# Patient Record
Sex: FEMALE | Race: WHITE | URBAN - METROPOLITAN AREA
[De-identification: names, ages, dates, MRNs, and addresses within clinical notes are randomized per-mention and may not be internally consistent; named-entity substitution may affect disease eponyms.]

---

## 2017-02-23 ENCOUNTER — OFFICE VISIT (OUTPATIENT)
Dept: RHEUMATOLOGY | Facility: CLINIC | Age: 52
End: 2017-02-23
Attending: INTERNAL MEDICINE
Payer: COMMERCIAL

## 2017-02-23 VITALS
HEART RATE: 72 BPM | SYSTOLIC BLOOD PRESSURE: 105 MMHG | HEIGHT: 67 IN | OXYGEN SATURATION: 95 % | DIASTOLIC BLOOD PRESSURE: 70 MMHG | BODY MASS INDEX: 21.03 KG/M2 | WEIGHT: 134 LBS

## 2017-02-23 DIAGNOSIS — M05.89 OTHER RHEUMATOID ARTHRITIS WITH RHEUMATOID FACTOR OF MULTIPLE SITES (H): ICD-10-CM

## 2017-02-23 DIAGNOSIS — M05.89 OTHER RHEUMATOID ARTHRITIS WITH RHEUMATOID FACTOR OF MULTIPLE SITES (H): Primary | ICD-10-CM

## 2017-02-23 LAB
ALT SERPL W P-5'-P-CCNC: 51 U/L (ref 0–50)
AST SERPL W P-5'-P-CCNC: 29 U/L (ref 0–45)
BASOPHILS # BLD AUTO: 0 10E9/L (ref 0–0.2)
BASOPHILS NFR BLD AUTO: 0.6 %
CREAT SERPL-MCNC: 0.73 MG/DL (ref 0.52–1.04)
CRP SERPL-MCNC: <2.9 MG/L (ref 0–8)
DIFFERENTIAL METHOD BLD: NORMAL
EOSINOPHIL # BLD AUTO: 0.2 10E9/L (ref 0–0.7)
EOSINOPHIL NFR BLD AUTO: 3.6 %
ERYTHROCYTE [DISTWIDTH] IN BLOOD BY AUTOMATED COUNT: 12.3 % (ref 10–15)
GFR SERPL CREATININE-BSD FRML MDRD: 84 ML/MIN/1.7M2
HCT VFR BLD AUTO: 40.9 % (ref 35–47)
HGB BLD-MCNC: 13.2 G/DL (ref 11.7–15.7)
IMM GRANULOCYTES # BLD: 0 10E9/L (ref 0–0.4)
IMM GRANULOCYTES NFR BLD: 0.2 %
LYMPHOCYTES # BLD AUTO: 1.3 10E9/L (ref 0.8–5.3)
LYMPHOCYTES NFR BLD AUTO: 26 %
MCH RBC QN AUTO: 30.3 PG (ref 26.5–33)
MCHC RBC AUTO-ENTMCNC: 32.3 G/DL (ref 31.5–36.5)
MCV RBC AUTO: 94 FL (ref 78–100)
MONOCYTES # BLD AUTO: 0.5 10E9/L (ref 0–1.3)
MONOCYTES NFR BLD AUTO: 9.4 %
NEUTROPHILS # BLD AUTO: 3 10E9/L (ref 1.6–8.3)
NEUTROPHILS NFR BLD AUTO: 60.2 %
NRBC # BLD AUTO: 0 10*3/UL
NRBC BLD AUTO-RTO: 0 /100
PLATELET # BLD AUTO: 197 10E9/L (ref 150–450)
RBC # BLD AUTO: 4.35 10E12/L (ref 3.8–5.2)
WBC # BLD AUTO: 5 10E9/L (ref 4–11)

## 2017-02-23 PROCEDURE — 90732 PPSV23 VACC 2 YRS+ SUBQ/IM: CPT | Mod: ZF

## 2017-02-23 PROCEDURE — 84460 ALANINE AMINO (ALT) (SGPT): CPT | Performed by: INTERNAL MEDICINE

## 2017-02-23 PROCEDURE — 85025 COMPLETE CBC W/AUTO DIFF WBC: CPT | Performed by: INTERNAL MEDICINE

## 2017-02-23 PROCEDURE — 82565 ASSAY OF CREATININE: CPT | Performed by: INTERNAL MEDICINE

## 2017-02-23 PROCEDURE — 84450 TRANSFERASE (AST) (SGOT): CPT | Performed by: INTERNAL MEDICINE

## 2017-02-23 PROCEDURE — 25000128 H RX IP 250 OP 636: Mod: ZF

## 2017-02-23 PROCEDURE — 36415 COLL VENOUS BLD VENIPUNCTURE: CPT | Performed by: INTERNAL MEDICINE

## 2017-02-23 PROCEDURE — 99212 OFFICE O/P EST SF 10 MIN: CPT | Mod: 25,ZF

## 2017-02-23 PROCEDURE — G0009 ADMIN PNEUMOCOCCAL VACCINE: HCPCS | Mod: ZF

## 2017-02-23 PROCEDURE — 86140 C-REACTIVE PROTEIN: CPT | Performed by: INTERNAL MEDICINE

## 2017-02-23 ASSESSMENT — PAIN SCALES - GENERAL: PAINLEVEL: NO PAIN (0)

## 2017-02-23 NOTE — NURSING NOTE
"Chief Complaint   Patient presents with     RECHECK     RA       Initial /70 (BP Location: Right arm, Patient Position: Chair, Cuff Size: Adult Regular)  Pulse 72  Ht 1.702 m (5' 7\")  Wt 60.8 kg (134 lb)  SpO2 95%  BMI 20.99 kg/m2 Estimated body mass index is 20.99 kg/(m^2) as calculated from the following:    Height as of this encounter: 1.702 m (5' 7\").    Weight as of this encounter: 60.8 kg (134 lb).  Medication Reconciliation: complete  Daria Beyer CMA    "

## 2017-02-23 NOTE — MR AVS SNAPSHOT
After Visit Summary   2/23/2017    Daniel He    MRN: [id number]           Patient Information     Date Of Birth          1965        Visit Information        Provider Department      2/23/2017 1:30 PM Greg Merida MD [facility] Rheumatology        Today's Diagnoses     Other rheumatoid arthritis with rheumatoid factor of multiple sites (H)    -  1       Follow-ups after your visit        Future tests that were ordered for you today     Open Future Orders        Priority Expected Expires Ordered    CBC with platelets differential Routine  3/25/2017 2/23/2017    AST Routine  3/25/2017 2/23/2017    ALT Routine  3/25/2017 2/23/2017    Creatinine Routine  3/25/2017 2/23/2017    CRP inflammation Routine  3/25/2017 2/23/2017    X-ray bl hand 3+ vw Routine 2/23/2017 9/21/2017 2/23/2017    X-ray bl Foot 3+ views Routine 2/23/2017 9/21/2017 2/23/2017            Who to contact     If you have questions or need follow up information about today's clinic visit or your schedule please contact [facility] RHEUMATOLOGY directly at [phone].  Normal or non-critical lab and imaging results will be communicated to you by [employer], letter or phone within 4 business days after the clinic has received the results. If you do not hear from us within 7 days, please contact the clinic through [employer] or phone. If you have a critical or abnormal lab result, we will notify you by phone as soon as possible.  Submit refill requests through [employer] or call your pharmacy and they will forward the refill request to us. Please allow 3 business days for your refill to be completed.          Additional Information About Your Visit        MyChart Information     [employer] gives you secure access to your electronic health record. If you see a primary care provider, you can also send messages to your care team and make appointments. If you have questions, please call your primary care clinic.  If you do not have a primary  "care provider, please call [phone] and they will assist you.        Care EveryWhere ID     This is your Care EveryWhere ID. This could be used by other organizations to access your [place] medical records  [id number]        Your Vitals Were     Pulse Height Pulse Oximetry BMI (Body Mass Index)          72 1.702 m (5' 7\") 95% 20.99 kg/m2         Blood Pressure from Last 3 Encounters:   02/23/17 105/70   02/04/16 117/68   04/09/15 113/60    Weight from Last 3 Encounters:   02/23/17 60.8 kg (134 lb)   02/04/16 71 kg (156 lb 9.6 oz)   04/09/15 67.3 kg (148 lb 6.4 oz)              We Performed the Following     PNEUMOCOCCAL VACCINE,ADULT,SQ OR IM        Primary Care Provider Office Phone # Fax #    Melina Mckay [phone]       [id number] [facility]   [place] [address]        Thank you!     Thank you for choosing [facility] RHEUMATOLOGY  for your care. Our goal is always to provide you with excellent care. Hearing back from our patients is one way we can continue to improve our services. Please take a few minutes to complete the written survey that you may receive in the mail after your visit with us. Thank you!             Your Updated Medication List - Protect others around you: Learn how to safely use, store and throw away your medicines at www.disposemymeds.org.          This list is accurate as of: 2/23/17  2:02 PM.  Always use your most recent med list.                   Brand Name Dispense Instructions for use    calcium-vitamin D 600-400 MG-UNIT per tablet    CALTRATE    120 tablet    Take 1 tablet by mouth 2 times daily       Etanercept 50 MG/ML autoinjector    ENBREL SURECLICK    1 kit    Inject 50 mg Subcutaneous every 7 days       FIBER PO          FOSAMAX PO      Take 70 mg by mouth once a week       sertraline 50 MG tablet    ZOLOFT     Take 50 mg by mouth daily         "

## 2017-02-23 NOTE — PROGRESS NOTES
[facility] - Rheumatology Clinic Visit     Daniel He MRN# [id number]   YOB: 1965      Primary care provider: Melina Mckay          Assessment and Plan:   #1 Nodular high -titer ACPA and RF positive Rheumatoid arthritis diagnosed around 2000; no joint erosions noted in 2014  #3 Methotrexate use until about 2010  #4 Chronic enbrel monotherapy for RA  #6 Past h/o tobacco smoking - has tried chantix in past; - quit Jan 2015 at age 50  #7 H/o precancerous cervical lesion; s/p hysterectomy  #8 Osteoporosis- last DEXA 2011; currently on vitamin D, calcium; DEXA 2016- osteopenia    She has been on clinical remission for the past close to 3 years on enbrel monotherapy.   -Continue current management with Enbrel 50 mg/ml injection weekly monotherapy and ibuprofen very occasionally for pain.     Repeat xrays of hands and feet and CRP today. No indication for adding any DMARD for combination therapy at this time as she is in perfect remission even with enbrel monotherapy.    - Smoking cessation successfully in Jan 2015.   - Calcium 1200mg PO day and vitamin D 800 IU daily. Fosamax 70 mg PO weekly. Recheck DEXA 2017    I discussed that I will be moving to [facility] in April 2017. Patient's choice to f/u with Rheumatology at the [facility] or with me in [facility] in 3 months. Patient would receive a letter regarding this sometime in March 2017. Patient prefers to follow up with me in [facility].     Most Recent Immunizations   Administered Date(s) Administered     Influenza (IIV3) 10/01/2013     Influenza Vaccine, 3 YRS +, IM (QUADRIVALENT W/PRESERVATIVES) 02/04/2016     Pneumococcal (PCV 13) 02/04/2016   Pended Date(s) Pended     Pneumococcal 23 valent 02/23/2017      Orders Placed This Encounter   Procedures     X-ray bl hand 3+ vw     X-ray bl Foot 3+ views     PNEUMOCOCCAL VACCINE,ADULT,SQ OR IM     CBC with platelets differential     AST     ALT     Creatinine      CRP inflammation       There are no discontinued medications.  Current Outpatient Prescriptions   Medication Sig Dispense Refill     Alendronate Sodium (FOSAMAX PO) Take 70 mg by mouth once a week       Etanercept (ENBREL SURECLICK) 50 MG/ML autoinjector Inject 50 mg Subcutaneous every 7 days 1 kit 2     calcium-vitamin D (CALTRATE) 600-400 MG-UNIT per tablet Take 1 tablet by mouth 2 times daily 120 tablet 3     sertraline (ZOLOFT) 50 MG tablet Take 50 mg by mouth daily       FIBER PO            Greg Merida MD    Division of Rheumatology  [facility]  Phone (Patients line): [phone]  Pager (healthcare professionals only): [phone]  Phone (healthcare professionals only line): [phone]            Active Problem List:     Patient Active Problem List    Diagnosis Date Noted     Other rheumatoid arthritis with rheumatoid factor of multiple sites (H) 02/04/2016     Priority: Medium            History of Present Illness:     Chief Complaint   Patient presents with     RECHECK     RA     Ms. 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  She presents to clinic today to establish care with a new rheumatologist.  Her previous rheumatologist was Dr. Jovel and was last seen 9 months ago.  She was initially treated with methotrexate and prednisone for her RA though stopped her methotrexate about 4 years ago. Reason for discontinuation: GI side effects.  Enbrel was initiated 8 years ago.  H/o rheumatoid nodule removed surgically in the past from dorsal aspect of R 2nd finger.  She reports that her RA is under control with daily morning stiffness that lasts approximately 15 minutes.  Her only real complaint today is fatigue, which she believes has gotten worse in the past year.      +Fatigue  +Occasional myalgias/arthralgias  +Family hx of gout  +photosensitivity - long history of sensitive skin  but worsened since starting RA medications  +constipation - currently managed on benefiber and probiotics  Current smoker - tried quitting multiple times.   H/o precancerous cervical lesion s/p hysterectomy  Has h/o osteoporosis    No h/o weight loss, loss of appetite, fevers, chills, rash, night sweats, swollen glands  No personal h/o gout   No family or personal history of psoriasis, ulcerative colitis or chron's disease. No high risk sexual history. No low back pain or buttock pain. No h/o sausage digits, iritis. No enthesitis, plantar fascitis or heel pain.   Patient denies any raynauds, malar rash, recurrent mouth/genital ulcers, sicca symptoms, recurrent sinusitis/rhinitis,swallowing difficulty, hearing or visual changes recently.   No h/o miscarriages or arterial/venous thrombosis in the past  No h/o persistent shortness of breath, cough, chest pain  No h/o persistent nausea, vomiting, diarrhea, abdominal pain  No h/o hematochezia, hematuria, hemoptysis, hematemesis  No persistent headache, tingling, numbness, weakness. No h/o seizures or strokes  No temporal headache, no jaw claudication, no scalp tenderness, vision changes, carotidynia, cough    Orders Placed during initial Encounter 2014  XR Hand Bilateral G/E 3 Views  XR Foot Bilateral G/E 3 Views  M Tuberculosis by Quantiferon  CRP inflammation  Erythrocyte sedimentation rate auto  Cyclic Citrullinated Peptide IgG IgA  Hepatitis B surface antigen  Hepatitis C antibody  Antinuclear antibody screen by EIA  Rheumatoid factor    ACPA >250; RF > 600 consistent with the diagnosis of rheumatoid arthritis  Inflammatory markers negative.   Rest of the above labs are within normal limits. No erosions noted in xrays.     RIGOBERTO neg    Interim history:    Interim history:  Since last visit:  1. Infections - No  2. New symptoms/medical problem - No  3. Any side effects from Rheum medications - No  3. ER visits/Hospitalizations/surgeries - No    No morning stiffness X 5  to 10 mins  Denies any daily joint pains    February 23, 2017   Joint pain history:  Involved joints: feet   Pain scale: 3-4/10     Wakes the patient from sleep : no  Morning stiffness :  10 mins  Meds used: Enbrel weekly    Patient can noticed increased achiness on the sixth day of the enbrel cycle  Lost 20 pounds with exercise.     Wt Readings from Last 4 Encounters:   02/23/17 60.8 kg (134 lb)   02/04/16 71 kg (156 lb 9.6 oz)   04/09/15 67.3 kg (148 lb 6.4 oz)   10/03/14 64 kg (141 lb)          Review of Systems:   Complete ROS negative except for symptoms mentioned in the HPI          Past Medical History:     Past Medical History   Diagnosis Date     Anxiety      On sertraline     Hypovitaminosis D      Nephrolithiasis      Osteoporosis      Rheumatoid arthritis(714.0)      Dx'd 12 yrs ago     Past Surgical History   Procedure Laterality Date     Hysterectomy       For precancerous cervical lesions            Social History:     Social History     Occupational History     Shipping/[employer]      Social History Main Topics     Smoking status: Former Smoker     Packs/day: 1.00     Years: 25.00     Types: Cigarettes     Smokeless tobacco: Former User     Quit date: 1/10/2015     Alcohol use 4.2 oz/week     7 Standard drinks or equivalent per week     Drug use: No     Sexual activity: Not on file     Works in [employer]  Smokes 1ppd x 25 years  - quit 2015  EtOH - <1 drink/week  Denies illegal drug use       Family History:     Family History   Problem Relation Age of Onset     DIABETES Father      DIABETES Other      maternal grandparent     Hypertension Father      Hypertension Mother      Rheumatoid Arthritis Paternal Grandmother        Father - living - DM 2, HTN, thyroid disorder (pt unable to specify)  Mother - living - HTN         Allergies:   No Known Allergies         Medications:     Current Outpatient Prescriptions   Medication Sig Dispense Refill     Alendronate Sodium (FOSAMAX PO) Take  "70 mg by mouth once a week       Etanercept (ENBREL SURECLICK) 50 MG/ML autoinjector Inject 50 mg Subcutaneous every 7 days 1 kit 2     calcium-vitamin D (CALTRATE) 600-400 MG-UNIT per tablet Take 1 tablet by mouth 2 times daily 120 tablet 3     sertraline (ZOLOFT) 50 MG tablet Take 50 mg by mouth daily       FIBER PO               Physical Exam:   Blood pressure 105/70, pulse 72, height 1.702 m (5' 7\"), weight 60.8 kg (134 lb), SpO2 95 %.  Wt Readings from Last 4 Encounters:   02/23/17 60.8 kg (134 lb)   02/04/16 71 kg (156 lb 9.6 oz)   04/09/15 67.3 kg (148 lb 6.4 oz)   10/03/14 64 kg (141 lb)       Constitutional: well-developed, appearing stated age; cooperative  Eyes: nl EOM, PERRLA, vision, conjunctiva, sclera  ENT: nl external ears, nose, hearing, lips, teeth, gums, throat  No mucous membrane lesions, normal saliva pool  Neck: no mass or thyroid enlargement  Resp: lungs clear to auscultation,   CV: RRR, no murmurs, rubs or gallops, no edema  GI: no ABD mass or tenderness, no HSM  : not tested  Lymph: no cervical, supraclavicular or epitrochlear nodes  MS: All TMJ, neck, shoulder, elbow, wrist, MCP/PIP/DIP, spine, hip, knee, ankle, and foot  MTP/IP joints were examined. Minimal synovial thickening of MCP/PIPs b/l- has resolved. Rheumatoid nodule on palmar aspect of R 4th finger still there. Right elbow 0.3cm rheumatoid nodule - smaller than last year  2 big subcutaneous nodules in the great toes bilaterally. Full ROM.  Fist 100%.  No dactylitis,  tenosynovitis, enthesopathy.  Skin: no nail pitting, alopecia, rash, nodules or lesions  Psych: nl judgement, orientation, memory, affect.         Data:     Results for orders placed or performed in visit on 02/04/16   XR Foot Bilateral G/E 3 Views    Narrative    Exam: 3 views bilateral foot    History: Rheumatoid arthritis.    Comparison: 04/07/2014.    Findings:    AP, oblique, and lateral views of each foot were obtained.    Left: There is redemonstration of " soft tissue prominence at the first  interphalangeal joint medially with underlying erosive change with  sclerotic margin, similar to prior. No underlying erosive change. No  acute osseous abnormalities. No acute osseous abnormality.    Right: Redemonstration of focal soft tissue swelling with underlying  cystic change of the medial arch of first interphalangeal joint. There  is redemonstration of cystic change involving the fifth metatarsal  neck, progressed from prior.. Somewhat deformed contour of the fifth  metatarsal neck, maybe related to prior trauma. Plantar calcaneal  enthesophyte.      Impression    Impression:  1. Unchanged soft tissue prominence with underlying erosion/cystic  change of the bilateral first interphalangeal joints. These findings  radiographically favors entity such as gout with associated gouty  tophus.   2. Progression of the erosive/cystic change of the fifth metatarsal  head change, which may be from underlying rheumatoid arthritis  alternatively representing crystalline deposition disease.    NED JETER       No results found for this basename: WBC:3,RBC:3,HGB:3,HCT:3,MCT:3,MCV:3,RDW:3,PLT:3,CCPAB,ALBUMIN:3, AST:3, ALT:3,CRP:3, ESR:3,BUN:3,CREAT:3 in the last 95533 hours   No results found for this basename: TSH:3,T4:3 in the last 05465 hours  Greg Merida MD    Division of Rheumatology  [facility]  Phone (Patients line): [phone]  Pager (healthcare professionals only): [phone]  Phone (healthcare professionals only line): [phone]  HPI      ROS      Physical Exam

## 2017-02-23 NOTE — LETTER
2/23/2017      RE: Daniel He  [address] [facility] [address]       [facility] - Rheumatology Clinic Visit     Daniel He MRN# [id number]   YOB: 1965      Primary care provider: Melina Mckay          Assessment and Plan:   #1 Nodular high -titer ACPA and RF positive Rheumatoid arthritis diagnosed around 2000; no joint erosions noted in 2014  #3 Methotrexate use until about 2010  #4 Chronic enbrel monotherapy for RA  #6 Past h/o tobacco smoking - has tried chantix in past; - quit Jan 2015 at age 50  #7 H/o precancerous cervical lesion; s/p hysterectomy  #8 Osteoporosis- last DEXA 2011; currently on vitamin D, calcium; DEXA 2016- osteopenia    She has been on clinical remission for the past close to 3 years on enbrel monotherapy.   -Continue current management with Enbrel 50 mg/ml injection weekly monotherapy and ibuprofen very occasionally for pain.     Repeat xrays of hands and feet and CRP today. No indication for adding any DMARD for combination therapy at this time as she is in perfect remission even with enbrel monotherapy.    - Smoking cessation successfully in Jan 2015.   - Calcium 1200mg PO day and vitamin D 800 IU daily. Fosamax 70 mg PO weekly. Recheck DEXA 2017    I discussed that I will be moving to [facility] in April 2017. Patient's choice to f/u with Rheumatology at the [facility] or with me in [facility] in 3 months. Patient would receive a letter regarding this sometime in March 2017. Patient prefers to follow up with me in [facility].     Most Recent Immunizations   Administered Date(s) Administered     Influenza (IIV3) 10/01/2013     Influenza Vaccine, 3 YRS +, IM (QUADRIVALENT W/PRESERVATIVES) 02/04/2016     Pneumococcal (PCV 13) 02/04/2016   Pended Date(s) Pended     Pneumococcal 23 valent 02/23/2017      Orders Placed This Encounter   Procedures     X-ray bl hand 3+ vw     X-ray bl Foot 3+ views     PNEUMOCOCCAL  VACCINE,ADULT,SQ OR IM     CBC with platelets differential     AST     ALT     Creatinine     CRP inflammation       There are no discontinued medications.  Current Outpatient Prescriptions   Medication Sig Dispense Refill     Alendronate Sodium (FOSAMAX PO) Take 70 mg by mouth once a week       Etanercept (ENBREL SURECLICK) 50 MG/ML autoinjector Inject 50 mg Subcutaneous every 7 days 1 kit 2     calcium-vitamin D (CALTRATE) 600-400 MG-UNIT per tablet Take 1 tablet by mouth 2 times daily 120 tablet 3     sertraline (ZOLOFT) 50 MG tablet Take 50 mg by mouth daily       FIBER PO            Greg Merida MD    Division of Rheumatology  [facility]  Phone (Patients line): [phone]  Pager (healthcare professionals only): [phone]  Phone (healthcare professionals only line): [phone]            Active Problem List:     Patient Active Problem List    Diagnosis Date Noted     Other rheumatoid arthritis with rheumatoid factor of multiple sites (H) 02/04/2016     Priority: Medium            History of Present Illness:     Chief Complaint   Patient presents with     RECHECK     RA     Ms. 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  She presents to clinic today to establish care with a new rheumatologist.  Her previous rheumatologist was Dr. Jovel and was last seen 9 months ago.  She was initially treated with methotrexate and prednisone for her RA though stopped her methotrexate about 4 years ago. Reason for discontinuation: GI side effects.  Enbrel was initiated 8 years ago.  H/o rheumatoid nodule removed surgically in the past from dorsal aspect of R 2nd finger.  She reports that her RA is under control with daily morning stiffness that lasts approximately 15 minutes.  Her only real complaint today is fatigue, which she believes has gotten worse in the past year.      +Fatigue  +Occasional  myalgias/arthralgias  +Family hx of gout  +photosensitivity - long history of sensitive skin but worsened since starting RA medications  +constipation - currently managed on benefiber and probiotics  Current smoker - tried quitting multiple times.   H/o precancerous cervical lesion s/p hysterectomy  Has h/o osteoporosis    No h/o weight loss, loss of appetite, fevers, chills, rash, night sweats, swollen glands  No personal h/o gout   No family or personal history of psoriasis, ulcerative colitis or chron's disease. No high risk sexual history. No low back pain or buttock pain. No h/o sausage digits, iritis. No enthesitis, plantar fascitis or heel pain.   Patient denies any raynauds, malar rash, recurrent mouth/genital ulcers, sicca symptoms, recurrent sinusitis/rhinitis,swallowing difficulty, hearing or visual changes recently.   No h/o miscarriages or arterial/venous thrombosis in the past  No h/o persistent shortness of breath, cough, chest pain  No h/o persistent nausea, vomiting, diarrhea, abdominal pain  No h/o hematochezia, hematuria, hemoptysis, hematemesis  No persistent headache, tingling, numbness, weakness. No h/o seizures or strokes  No temporal headache, no jaw claudication, no scalp tenderness, vision changes, carotidynia, cough    Orders Placed during initial Encounter 2014  XR Hand Bilateral G/E 3 Views  XR Foot Bilateral G/E 3 Views  M Tuberculosis by Quantiferon  CRP inflammation  Erythrocyte sedimentation rate auto  Cyclic Citrullinated Peptide IgG IgA  Hepatitis B surface antigen  Hepatitis C antibody  Antinuclear antibody screen by EIA  Rheumatoid factor    ACPA >250; RF > 600 consistent with the diagnosis of rheumatoid arthritis  Inflammatory markers negative.   Rest of the above labs are within normal limits. No erosions noted in xrays.     RIGOBERTO neg    Interim history:    Interim history:  Since last visit:  1. Infections - No  2. New symptoms/medical problem - No  3. Any side effects from  Rheum medications - No  3. ER visits/Hospitalizations/surgeries - No    No morning stiffness X 5 to 10 mins  Denies any daily joint pains    February 23, 2017   Joint pain history:  Involved joints: feet   Pain scale: 3-4/10     Wakes the patient from sleep : no  Morning stiffness :  10 mins  Meds used: Enbrel weekly    Patient can noticed increased achiness on the sixth day of the enbrel cycle  Lost 20 pounds with exercise.     Wt Readings from Last 4 Encounters:   02/23/17 60.8 kg (134 lb)   02/04/16 71 kg (156 lb 9.6 oz)   04/09/15 67.3 kg (148 lb 6.4 oz)   10/03/14 64 kg (141 lb)          Review of Systems:   Complete ROS negative except for symptoms mentioned in the HPI          Past Medical History:     Past Medical History   Diagnosis Date     Anxiety      On sertraline     Hypovitaminosis D      Nephrolithiasis      Osteoporosis      Rheumatoid arthritis(714.0)      Dx'd 12 yrs ago     Past Surgical History   Procedure Laterality Date     Hysterectomy       For precancerous cervical lesions            Social History:     Social History     Occupational History     [employer]      Social History Main Topics     Smoking status: Former Smoker     Packs/day: 1.00     Years: 25.00     Types: Cigarettes     Smokeless tobacco: Former User     Quit date: 1/10/2015     Alcohol use 4.2 oz/week     7 Standard drinks or equivalent per week     Drug use: No     Sexual activity: Not on file     Works in [employer]  Smokes 1ppd x 25 years  - quit 2015  EtOH - <1 drink/week  Denies illegal drug use       Family History:     Family History   Problem Relation Age of Onset     DIABETES Father      DIABETES Other      maternal grandparent     Hypertension Father      Hypertension Mother      Rheumatoid Arthritis Paternal Grandmother        Father - living - DM 2, HTN, thyroid disorder (pt unable to specify)  Mother - living - HTN         Allergies:   No Known Allergies         Medications:     Current  "Outpatient Prescriptions   Medication Sig Dispense Refill     Alendronate Sodium (FOSAMAX PO) Take 70 mg by mouth once a week       Etanercept (ENBREL SURECLICK) 50 MG/ML autoinjector Inject 50 mg Subcutaneous every 7 days 1 kit 2     calcium-vitamin D (CALTRATE) 600-400 MG-UNIT per tablet Take 1 tablet by mouth 2 times daily 120 tablet 3     sertraline (ZOLOFT) 50 MG tablet Take 50 mg by mouth daily       FIBER PO               Physical Exam:   Blood pressure 105/70, pulse 72, height 1.702 m (5' 7\"), weight 60.8 kg (134 lb), SpO2 95 %.  Wt Readings from Last 4 Encounters:   02/23/17 60.8 kg (134 lb)   02/04/16 71 kg (156 lb 9.6 oz)   04/09/15 67.3 kg (148 lb 6.4 oz)   10/03/14 64 kg (141 lb)       Constitutional: well-developed, appearing stated age; cooperative  Eyes: nl EOM, PERRLA, vision, conjunctiva, sclera  ENT: nl external ears, nose, hearing, lips, teeth, gums, throat  No mucous membrane lesions, normal saliva pool  Neck: no mass or thyroid enlargement  Resp: lungs clear to auscultation,   CV: RRR, no murmurs, rubs or gallops, no edema  GI: no ABD mass or tenderness, no HSM  : not tested  Lymph: no cervical, supraclavicular or epitrochlear nodes  MS: All TMJ, neck, shoulder, elbow, wrist, MCP/PIP/DIP, spine, hip, knee, ankle, and foot  MTP/IP joints were examined. Minimal synovial thickening of MCP/PIPs b/l- has resolved. Rheumatoid nodule on palmar aspect of R 4th finger still there. Right elbow 0.3cm rheumatoid nodule - smaller than last year  2 big subcutaneous nodules in the great toes bilaterally. Full ROM.  Fist 100%.  No dactylitis,  tenosynovitis, enthesopathy.  Skin: no nail pitting, alopecia, rash, nodules or lesions  Psych: nl judgement, orientation, memory, affect.         Data:     Results for orders placed or performed in visit on 02/04/16   XR Foot Bilateral G/E 3 Views    Narrative    Exam: 3 views bilateral foot    History: Rheumatoid arthritis.    Comparison: " 2014.    Findings:    AP, oblique, and lateral views of each foot were obtained.    Left: There is redemonstration of soft tissue prominence at the first  interphalangeal joint medially with underlying erosive change with  sclerotic margin, similar to prior. No underlying erosive change. No  acute osseous abnormalities. No acute osseous abnormality.    Right: Redemonstration of focal soft tissue swelling with underlying  cystic change of the medial arch of first interphalangeal joint. There  is redemonstration of cystic change involving the fifth metatarsal  neck, progressed from prior.. Somewhat deformed contour of the fifth  metatarsal neck, maybe related to prior trauma. Plantar calcaneal  enthesophyte.      Impression    Impression:  1. Unchanged soft tissue prominence with underlying erosion/cystic  change of the bilateral first interphalangeal joints. These findings  radiographically favors entity such as gout with associated gouty  tophus.   2. Progression of the erosive/cystic change of the fifth metatarsal  head change, which may be from underlying rheumatoid arthritis  alternatively representing crystalline deposition disease.    NED JETER       No results found for this basename: WBC:3,RBC:3,HGB:3,HCT:3,MCT:3,MCV:3,RDW:3,PLT:3,CCPAB,ALBUMIN:3, AST:3, ALT:3,CRP:3, ESR:3,BUN:3,CREAT:3 in the last 27801 hours   No results found for this basename: TSH:3,T4:3 in the last 45768 hours  Greg Merida MD    Division of Rheumatology  [facility]  Phone (Patients line): [phone]  Pager (healthcare professionals only): [phone]  Phone (healthcare professionals only line): [phone]

## 2017-02-24 NOTE — PROGRESS NOTES
Results released to [facility]:  Jarrod Sanchez,  Basic blood cell counts, liver and kidney function labs within normal limits except borderline ALT which can be watched. I recommend that ALT is repeated in 3 months to make sure. My nurse would contact you regarding that.   Inflammatory markers are normal.     Sincerely    Greg Merida MD

## 2017-02-28 ENCOUNTER — TELEPHONE (OUTPATIENT)
Dept: RHEUMATOLOGY | Facility: CLINIC | Age: 52
End: 2017-02-28

## 2017-02-28 NOTE — TELEPHONE ENCOUNTER
[facility] Prior Authorization Team   Phone: [phone]  Fax: [phone]    PA Initiation    Medication: Enbrel Sureclick  Insurance Company: [employer] - Phone [phone] Fax [phone]  Pharmacy Filling the Rx: [facility] - [place], [place] - [address]  Filling Pharmacy Phone: [phone]  Filling Pharmacy Fax: [phone]  Start Date: 2/28/2017

## 2017-03-01 ENCOUNTER — MYC MEDICAL ADVICE (OUTPATIENT)
Dept: RHEUMATOLOGY | Facility: CLINIC | Age: 52
End: 2017-03-01

## 2017-03-01 NOTE — TELEPHONE ENCOUNTER
Prior Authorization Approval    Authorization Effective Date: 2/28/2017  Authorization Expiration Date: 2/28/2019  Medication: Enbrel Sureclick - Approved  Approved Dose/Quantity: once weekly   Reference #: [id number]   Insurance Company: [employer] [phone] Fax [phone]  Expected CoPay:       CoPay Card Available:      Foundation Assistance Needed:    Which Pharmacy is filling the prescription (Not needed for infusion/clinic administered): [facility] - [place], [place] - [facility] [address]  Pharmacy Notified: Yes  Patient Notified: Yes

## 2017-03-02 DIAGNOSIS — M05.89 OTHER RHEUMATOID ARTHRITIS WITH RHEUMATOID FACTOR OF MULTIPLE SITES (H): ICD-10-CM

## 2017-10-16 DIAGNOSIS — M06.9 RHEUMATOID ARTHRITIS INVOLVING MULTIPLE JOINTS (H): Primary | ICD-10-CM

## 2017-10-16 NOTE — LETTER
October 17, 2017      TO: Daniel Villegasnes  [address] [address] [place] [id number]     Dear Ms. Daniel He,    This letter is a reminder that you are overdue for an Annual Visit and needed labs. You must be seen on a yearly basis and have appropriate labs drawn for continued care and prescription refills.   Please call to schedule an appointment.  ANTONIETA VIZCAINO is now at [facility]; you may schedule there or continue here.    You have been given only a  90 DAY  supply/refill of your ENBREL SURECLICK while you get your clinic visit/labs completed.    Regards, Rheumatology Clinic

## 2017-10-17 NOTE — TELEPHONE ENCOUNTER
ENBREL       Last Written Prescription Date:  3/2/17  Last Fill Quantity: 1KIT,   # refills: 5  Last Office Visit : 2/23/17  Future Office visit:  NONE  OVER DUE MD APPT.   LETTER +   90 DAY RF

## 2017-12-17 ENCOUNTER — HEALTH MAINTENANCE LETTER (OUTPATIENT)
Age: 52
End: 2017-12-17

## 2018-01-10 DIAGNOSIS — M06.9 RHEUMATOID ARTHRITIS INVOLVING MULTIPLE JOINTS (H): ICD-10-CM

## 2018-01-10 NOTE — TELEPHONE ENCOUNTER
Enbrel   Last Written Prescription Date:  10/18/17  Last Fill Quantity: 3 kit,   # refills: 0  Last Office Visit : 2/3/17  Future Office visit:  Future appt at [facility]    Routing refill request to provider for review/approval because:  Former pt of Dr. Merida, has not been seen yet at [facility]

## 2018-01-11 ENCOUNTER — TELEPHONE (OUTPATIENT)
Dept: RHEUMATOLOGY | Facility: CLINIC | Age: 53
End: 2018-01-11

## 2018-01-11 NOTE — TELEPHONE ENCOUNTER
Prior Authorization Approval    Authorization Effective Date: 1/4/2018  Authorization Expiration Date: 1/11/2019  Medication: ENBREL - Approved  Approved Dose/Quantity: 4 for 28 days  Reference #: [id number]   Insurance Company: [email or web address] - Phone [phone] Fax [phone]  Expected CoPay:       CoPay Card Available: Yes   Foundation Assistance Needed:    Which Pharmacy is filling the prescription (Not needed for infusion/clinic administered): [facility] - [place], [place] - [facility] [address]  Pharmacy Notified: Yes  Patient Notified: Yes

## 2018-01-11 NOTE — TELEPHONE ENCOUNTER
PA Initiation    Medication: ENBREL  Insurance Company: [employer] - Phone [phone] Fax [phone]  Pharmacy Filling the Rx: [facility] - [place], [place] - [facility] [address]  Filling Pharmacy Phone:    Filling Pharmacy Fax:    Start Date: 1/11/2018

## 2018-01-30 DIAGNOSIS — M06.9 RHEUMATOID ARTHRITIS INVOLVING MULTIPLE JOINTS (H): ICD-10-CM

## 2018-01-30 NOTE — TELEPHONE ENCOUNTER
Patient calls back.  She is asking to disregard the request from [facility] and she is aware that [facility] has it.  She did already let [facility] know that they mistakenly sent this to us.    Gely Moss RN  Message handled by Nurse Triage.

## 2018-01-30 NOTE — TELEPHONE ENCOUNTER
Etanercept (ENBREL SURECLICK) 50 MG/ML autoinjector      Last Written Prescription Date:  1/10/2018  Last Fill Quantity: 3 kit,   # refills: 0  Last Office Visit: 2/23/2017  Future Office visit:    Next 5 appointments (look out 90 days)     Feb 26, 2018  2:15 PM CST   Return Visit with Greg Merida MD   [facility] ([address] [address] [phone]                   Routing refill request to provider for review/approval because:  Drug not on the [facility], [facility] or  Health refill protocol or controlled substance

## 2018-01-30 NOTE — TELEPHONE ENCOUNTER
There was a rx sent to  [facility] on 1/10/18 for Enbrel.     A PA was done (see 1/11/18 encounter) stating rx is being filled by  Specialty pharmacy.     Pharmacy requesting this refill is for [facility].     Left voicemail for patient to call back the clinic to confirm what pharmacy she is using for this medication.

## 2018-02-23 NOTE — PROGRESS NOTES
[place] - Rheumatology Clinic Visit     Daniel He MRN# [id number]   YOB: 1965      Primary care provider: Melina Mckay          Assessment and Plan:   #1 Nodular high -titer ACPA and RF positive (> 600) EROSIVE Rheumatoid arthritis diagnosed around 2000  #3 Methotrexate use until about 2010  #4 Chronic enbrel monotherapy for RA  #6 Past h/o tobacco smoking - has tried chantix in past; - quit Jan 2015 at age 50  #7 H/o precancerous cervical lesion; s/p hysterectomy  #8 Osteoporosis- last DEXA 2011; currently on vitamin D, calcium; DEXA 2016- osteopenia    She has been on clinical remission for the past close to 4 years on enbrel monotherapy. Okay to reduce enbrel to every 10 days. If flaring up with RA, then increase back to every 7 days.     Repeat xrays of hands and feet 2/19. No indication for adding any DMARD for combination therapy at this time as she is in perfect remission even with enbrel monotherapy.    - Smoking cessation successfully in Jan 2015.   - Calcium 1200mg PO day and vitamin D 800 IU daily. Fosamax 70 mg PO weekly. Recheck DEXA 2018. Patient mentioned she would discuss with PCP in summer 2018.     Most Recent Immunizations   Administered Date(s) Administered     Influenza (IIV3) PF 10/01/2013     Influenza Vaccine, 3 YRS +, IM (QUADRIVALENT W/PRESERVATIVES) 02/04/2016     Pneumo Conj 13-V (2010&after) 02/04/2016     Pneumococcal 23 valent 02/23/2017      Orders Placed This Encounter   Procedures     CBC with platelets differential     AST     ALT     Creatinine     Erythrocyte sedimentation rate auto     Hepatitis B surface antigen     Hepatitis C antibody     M Tuberculosis by Quantiferon       There are no discontinued medications.  Current Outpatient Prescriptions   Medication Sig Dispense Refill     Etanercept (ENBREL SURECLICK) 50 MG/ML autoinjector Inject 50 mg Subcutaneous See Admin Instructions Every 10 days. Hold for signs of infection, and seek medical attention. 1  kit 10     Etanercept (ENBREL SURECLICK) 50 MG/ML autoinjector Inject 50 mg Subcutaneous once a week 3 kit 0     Alendronate Sodium (FOSAMAX PO) Take 70 mg by mouth once a week       calcium-vitamin D (CALTRATE) 600-400 MG-UNIT per tablet Take 1 tablet by mouth 2 times daily 120 tablet 3     sertraline (ZOLOFT) 50 MG tablet Take 50 mg by mouth daily       FIBER PO          Greg Merida MD          Active Problem List:     Patient Active Problem List    Diagnosis Date Noted     Rheumatoid arthritis involving multiple joints (H) 02/04/2016     Priority: Medium            History of Present Illness:     Chief Complaint   Patient presents with     RECHECK     Ms. 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  She presents to clinic today to establish care with a new rheumatologist.  Her previous rheumatologist was Dr. Jovel and was last seen 9 months ago.  She was initially treated with methotrexate and prednisone for her RA though stopped her methotrexate about 4 years ago. Reason for discontinuation: GI side effects.  Enbrel was initiated 8 years ago.  H/o rheumatoid nodule removed surgically in the past from dorsal aspect of R 2nd finger.  She reports that her RA is under control with daily morning stiffness that lasts approximately 15 minutes.  Her only real complaint today is fatigue, which she believes has gotten worse in the past year.      +Fatigue  +Occasional myalgias/arthralgias  +Family hx of gout  +photosensitivity - long history of sensitive skin but worsened since starting RA medications  +constipation - currently managed on benefiber and probiotics  Current smoker - tried quitting multiple times.   H/o precancerous cervical lesion s/p hysterectomy  Has h/o osteoporosis    No h/o weight loss, loss of appetite, fevers, chills, rash, night sweats, swollen glands  No personal h/o gout   No family or personal history  of psoriasis, ulcerative colitis or chron's disease. No high risk sexual history. No low back pain or buttock pain. No h/o sausage digits, iritis. No enthesitis, plantar fascitis or heel pain.   Patient denies any raynauds, malar rash, recurrent mouth/genital ulcers, sicca symptoms, recurrent sinusitis/rhinitis,swallowing difficulty, hearing or visual changes recently.   No h/o miscarriages or arterial/venous thrombosis in the past  No h/o persistent shortness of breath, cough, chest pain  No h/o persistent nausea, vomiting, diarrhea, abdominal pain  No h/o hematochezia, hematuria, hemoptysis, hematemesis  No persistent headache, tingling, numbness, weakness. No h/o seizures or strokes  No temporal headache, no jaw claudication, no scalp tenderness, vision changes, carotidynia, cough    Orders Placed during initial Encounter 2014  XR Hand Bilateral G/E 3 Views  XR Foot Bilateral G/E 3 Views  M Tuberculosis by Quantiferon  CRP inflammation  Erythrocyte sedimentation rate auto  Cyclic Citrullinated Peptide IgG IgA  Hepatitis B surface antigen  Hepatitis C antibody  Antinuclear antibody screen by EIA  Rheumatoid factor    ACPA >250; RF > 600 consistent with the diagnosis of rheumatoid arthritis  Inflammatory markers negative.   Rest of the above labs are within normal limits. No erosions noted in xrays.     RIGOBERTO neg    Interim history:    Interim history:  Since last visit:  1. Infections - No  2. New symptoms/medical problem - No  3. Any side effects from Rheum medications - No  3. ER visits/Hospitalizations/surgeries - No    No morning stiffness X 5 to 10 mins  Denies any daily joint pains    February 23, 2017   Joint pain history:  Involved joints: feet   Pain scale: 3-4/10     Wakes the patient from sleep : no  Morning stiffness :  10 mins  Meds used: Enbrel weekly    Patient can noticed increased achiness on the sixth day of the enbrel cycle  Lost 20 pounds with exercise.     February 26, 2018  Have you ever seen a  rheumatologist Yes Who You When 2/23/17  Joint pain history  Onset: pt states that she is doing well. Has not had any flares  Involved joints: see above   Pain scale:  0.5/10     Wakes the patient from sleep : No  Morning stiffness:Yes for 5 minutes  Meds used:enbrel monotherapy     Interim history  Since last visit:  1. Infections - No  2. New symptoms/medical problem - No  3. Any side effects from Rheum medications -none  3. ER visits/Hospitalizations/surgeries - No  4. Last PCP visit: 3 days ago     Wt Readings from Last 4 Encounters:   02/26/18 63 kg (138 lb 12.8 oz)   02/23/17 60.8 kg (134 lb)   02/04/16 71 kg (156 lb 9.6 oz)   04/09/15 67.3 kg (148 lb 6.4 oz)          Review of Systems:   Complete ROS negative except for symptoms mentioned in the HPI          Past Medical History:     Past Medical History:   Diagnosis Date     Anxiety     On sertraline     Hypovitaminosis D      Nephrolithiasis      Osteoporosis      Rheumatoid arthritis(714.0)     Dx'd 12 yrs ago     Past Surgical History:   Procedure Laterality Date     HYSTERECTOMY      For precancerous cervical lesions            Social History:     Social History     Occupational History     [employer]      Social History Main Topics     Smoking status: Former Smoker     Packs/day: 1.00     Years: 25.00     Types: Cigarettes     Smokeless tobacco: Former User     Quit date: 1/10/2015     Alcohol use 0.0 oz/week     0 Standard drinks or equivalent per week      Comment: socially     Drug use: No     Sexual activity: Yes     Works in [employer]  Smokes 1ppd x 25 years  - quit 2015  EtOH - <1 drink/week  Denies illegal drug use       Family History:     Family History   Problem Relation Age of Onset     DIABETES Father      Hypertension Father      DIABETES Other      maternal grandparent     Hypertension Mother      Rheumatoid Arthritis Paternal Grandmother        Father - living - DM 2, HTN, thyroid disorder (pt unable to  "specify)  Mother - living - HTN         Allergies:   No Known Allergies         Medications:     Current Outpatient Prescriptions   Medication Sig Dispense Refill     Etanercept (ENBREL SURECLICK) 50 MG/ML autoinjector Inject 50 mg Subcutaneous See Admin Instructions Every 10 days. Hold for signs of infection, and seek medical attention. 1 kit 10     Etanercept (ENBREL SURECLICK) 50 MG/ML autoinjector Inject 50 mg Subcutaneous once a week 3 kit 0     Alendronate Sodium (FOSAMAX PO) Take 70 mg by mouth once a week       calcium-vitamin D (CALTRATE) 600-400 MG-UNIT per tablet Take 1 tablet by mouth 2 times daily 120 tablet 3     sertraline (ZOLOFT) 50 MG tablet Take 50 mg by mouth daily       FIBER PO               Physical Exam:   Blood pressure 104/62, pulse 75, temperature 97.9  F (36.6  C), temperature source Oral, height 1.702 m (5' 7\"), weight 63 kg (138 lb 12.8 oz), SpO2 96 %.  Wt Readings from Last 4 Encounters:   02/26/18 63 kg (138 lb 12.8 oz)   02/23/17 60.8 kg (134 lb)   02/04/16 71 kg (156 lb 9.6 oz)   04/09/15 67.3 kg (148 lb 6.4 oz)       Constitutional: well-developed, appearing stated age; cooperative  Eyes: nl EOM, PERRLA, vision, conjunctiva, sclera  ENT: nl external ears, nose, hearing, lips, teeth, gums, throat  No mucous membrane lesions, normal saliva pool  Neck: no mass or thyroid enlargement  Resp: lungs clear to auscultation,   CV: RRR, no murmurs, rubs or gallops, no edema  GI: no ABD mass or tenderness, no HSM  : not tested  Lymph: no cervical, supraclavicular or epitrochlear nodes  MS: All TMJ, neck, shoulder, elbow, wrist, MCP/PIP/DIP, spine, hip, knee, ankle, and foot  MTP/IP joints were examined. Minimal synovial thickening of MCP/PIPs b/l- has resolved. Rheumatoid nodule on palmar aspect of R 4th finger still there. Right elbow 0.3cm rheumatoid nodule - smaller than last year  2 big subcutaneous nodules in the great toes bilaterally. Full ROM.  Fist 100%.  No dactylitis,  " tenosynovitis, enthesopathy.  Skin: no nail pitting, alopecia, rash, nodules or lesions  Psych: nl judgement, orientation, memory, affect.         Data:     Results for orders placed or performed in visit on 17   X-ray bl Foot 3+ views    Narrative    EXAMINATION: XR FOOT BILATERAL G/E 3 VW  2017 2:45 PM     CLINICAL HISTORY:  Other rheumatoid arthritis with rheumatoid factor  of multiple sites     COMPARISON: 2016    FINDINGS: AP, oblique and lateral views of bilateral feet were  obtained.     Left foot: Redemonstration of soft tissue prominence at the first  distal interphalangeal joint on the left. Otherwise joint spaces are  preserved. Tarsal bones are normally aligned.    Right foot: Redemonstration of soft tissue swelling at the first  digits interphalangeal joint this associated marginal erosive changes  and subtle cortical sclerosis. Persistent cortical irregularity about  the fifth metatarsal head. Otherwise preserved joint spaces.      Impression    IMPRESSION:   1. Right foot first digits interphalangeal joints marginal erosive  changes and marked soft tissue swelling. Differential diagnosis  includes gout or other crystal deposition diseases. Persistent fifth  metatarsal head cortical irregularity.  2. Left foot first digits interphalangeal joint soft tissue swelling,  mild subcortical sclerosis and subcortical cystic changes, unchanged.  Similar differential considerations as were raised on the right.    JUTTA ELLERMANN, MD       No results found for this basename: WBC:3,RBC:3,HGB:3,HCT:3,MCT:3,MCV:3,RDW:3,PLT:3,CCPAB,ALBUMIN:3, AST:3, ALT:3,CRP:3, ESR:3,BUN:3,CREAT:3 in the last 77124 hours   No results found for this basename: TSH:3,T4:3 in the last 26397 hours  Greg Merida MD    Division of Rheumatology  [facility]  Phone (Patients line): [phone]  Pager (healthcare professionals only): [phone]  Phone (healthcare professionals only  line): [phone]  HPI      ROS      Physical Exam

## 2018-02-26 ENCOUNTER — OFFICE VISIT (OUTPATIENT)
Dept: RHEUMATOLOGY | Facility: CLINIC | Age: 53
End: 2018-02-26
Payer: COMMERCIAL

## 2018-02-26 VITALS
WEIGHT: 138.8 LBS | TEMPERATURE: 97.9 F | BODY MASS INDEX: 21.79 KG/M2 | HEIGHT: 67 IN | OXYGEN SATURATION: 96 % | DIASTOLIC BLOOD PRESSURE: 62 MMHG | SYSTOLIC BLOOD PRESSURE: 104 MMHG | HEART RATE: 75 BPM

## 2018-02-26 DIAGNOSIS — M06.9 RHEUMATOID ARTHRITIS INVOLVING MULTIPLE JOINTS (H): ICD-10-CM

## 2018-02-26 LAB
BASOPHILS # BLD AUTO: 0 10E9/L (ref 0–0.2)
BASOPHILS NFR BLD AUTO: 0.6 %
DIFFERENTIAL METHOD BLD: NORMAL
EOSINOPHIL # BLD AUTO: 0.4 10E9/L (ref 0–0.7)
EOSINOPHIL NFR BLD AUTO: 5.8 %
ERYTHROCYTE [DISTWIDTH] IN BLOOD BY AUTOMATED COUNT: 12.6 % (ref 10–15)
ERYTHROCYTE [SEDIMENTATION RATE] IN BLOOD BY WESTERGREN METHOD: 16 MM/H (ref 0–30)
HCT VFR BLD AUTO: 40.6 % (ref 35–47)
HGB BLD-MCNC: 13.1 G/DL (ref 11.7–15.7)
LYMPHOCYTES # BLD AUTO: 2.5 10E9/L (ref 0.8–5.3)
LYMPHOCYTES NFR BLD AUTO: 38.5 %
MCH RBC QN AUTO: 30.9 PG (ref 26.5–33)
MCHC RBC AUTO-ENTMCNC: 32.3 G/DL (ref 31.5–36.5)
MCV RBC AUTO: 96 FL (ref 78–100)
MONOCYTES # BLD AUTO: 0.4 10E9/L (ref 0–1.3)
MONOCYTES NFR BLD AUTO: 6.6 %
NEUTROPHILS # BLD AUTO: 3.1 10E9/L (ref 1.6–8.3)
NEUTROPHILS NFR BLD AUTO: 48.5 %
PLATELET # BLD AUTO: 218 10E9/L (ref 150–450)
RBC # BLD AUTO: 4.24 10E12/L (ref 3.8–5.2)
WBC # BLD AUTO: 6.4 10E9/L (ref 4–11)

## 2018-02-26 PROCEDURE — 99214 OFFICE O/P EST MOD 30 MIN: CPT | Performed by: INTERNAL MEDICINE

## 2018-02-26 PROCEDURE — 86803 HEPATITIS C AB TEST: CPT | Performed by: INTERNAL MEDICINE

## 2018-02-26 PROCEDURE — 85652 RBC SED RATE AUTOMATED: CPT | Performed by: INTERNAL MEDICINE

## 2018-02-26 PROCEDURE — 86480 TB TEST CELL IMMUN MEASURE: CPT | Performed by: INTERNAL MEDICINE

## 2018-02-26 PROCEDURE — 85025 COMPLETE CBC W/AUTO DIFF WBC: CPT | Performed by: INTERNAL MEDICINE

## 2018-02-26 PROCEDURE — 36415 COLL VENOUS BLD VENIPUNCTURE: CPT | Performed by: INTERNAL MEDICINE

## 2018-02-26 PROCEDURE — 84460 ALANINE AMINO (ALT) (SGPT): CPT | Performed by: INTERNAL MEDICINE

## 2018-02-26 PROCEDURE — 87340 HEPATITIS B SURFACE AG IA: CPT | Performed by: INTERNAL MEDICINE

## 2018-02-26 PROCEDURE — 84450 TRANSFERASE (AST) (SGOT): CPT | Performed by: INTERNAL MEDICINE

## 2018-02-26 PROCEDURE — 82565 ASSAY OF CREATININE: CPT | Performed by: INTERNAL MEDICINE

## 2018-02-26 ASSESSMENT — ROUTINE ASSESSMENT OF PATIENT INDEX DATA (RAPID3)
RAPID3 INTERPRETATION: REMISSION
TOTAL RAPID3 SCORE: 1

## 2018-02-26 NOTE — NURSING NOTE
"Chief Complaint   Patient presents with     RECHECK       Initial /62 (BP Location: Right arm, Patient Position: Chair, Cuff Size: Adult Regular)  Pulse 75  Temp 97.9  F (36.6  C) (Oral)  Ht 1.702 m (5' 7\")  Wt 63 kg (138 lb 12.8 oz)  SpO2 96%  BMI 21.74 kg/m2 Estimated body mass index is 21.74 kg/(m^2) as calculated from the following:    Height as of this encounter: 1.702 m (5' 7\").    Weight as of this encounter: 63 kg (138 lb 12.8 oz).  Medication Reconciliation: complete    Have you ever seen a rheumatologist Yes Who You When 2/23/17  Joint pain history  Onset: pt states that she is doing well. Has not had any flares  Involved joints: see above   Pain scale:  0.5/10     Wakes the patient from sleep : No  Morning stiffness:Yes for 5 minutes  Meds used:enbrel    Interim history  Since last visit:  1. Infections - No  2. New symptoms/medical problem - No  3. Any side effects from Rheum medications -none  3. ER visits/Hospitalizations/surgeries - No  4. Last PCP visit: 3 days ago   Wt Readings from Last 4 Encounters:   02/26/18 63 kg (138 lb 12.8 oz)   02/23/17 60.8 kg (134 lb)   02/04/16 71 kg (156 lb 9.6 oz)   04/09/15 67.3 kg (148 lb 6.4 oz)     BP Readings from Last 3 Encounters:   02/26/18 104/62   02/23/17 105/70   02/04/16 117/68       "

## 2018-02-26 NOTE — MR AVS SNAPSHOT
"              After Visit Summary   2/26/2018    Daniel He    MRN: [id number]           Patient Information     Date Of Birth          1965        Visit Information        Provider Department      2/26/2018 2:15 PM Greg Merida MD [facility]        Today's Diagnoses     Rheumatoid arthritis involving multiple joints (H)           Follow-ups after your visit        Follow-up notes from your care team     Return in about 1 year (around 2/26/2019).      Who to contact     If you have questions or need follow up information about today's clinic visit or your schedule please contact [facility] directly at [phone].  Normal or non-critical lab and imaging results will be communicated to you by MyChart, letter or phone within 4 business days after the clinic has received the results. If you do not hear from us within 7 days, please contact the clinic through [employer] or phone. If you have a critical or abnormal lab result, we will notify you by phone as soon as possible.  Submit refill requests through [employer] or call your pharmacy and they will forward the refill request to us. Please allow 3 business days for your refill to be completed.          Additional Information About Your Visit        MyChart Information     [employer] gives you secure access to your electronic health record. If you see a primary care provider, you can also send messages to your care team and make appointments. If you have questions, please call your primary care clinic.  If you do not have a primary care provider, please call [phone] and they will assist you.        Care EveryWhere ID     This is your Care EveryWhere ID. This could be used by other organizations to access your [place] medical records  [id number]        Your Vitals Were     Pulse Temperature Height Pulse Oximetry BMI (Body Mass Index)       75 97.9  F (36.6  C) (Oral) 1.702 m (5' 7\") 96% 21.74 kg/m2        Blood Pressure from " Last 3 Encounters:   02/26/18 104/62   02/23/17 105/70   02/04/16 117/68    Weight from Last 3 Encounters:   02/26/18 63 kg (138 lb 12.8 oz)   02/23/17 60.8 kg (134 lb)   02/04/16 71 kg (156 lb 9.6 oz)              We Performed the Following     ALT     AST     CBC with platelets differential     Creatinine     Erythrocyte sedimentation rate auto     Hepatitis B surface antigen     Hepatitis C antibody     M Tuberculosis by Quantiferon          Today's Medication Changes          These changes are accurate as of 2/26/18  2:39 PM.  If you have any questions, ask your nurse or doctor.               These medicines have changed or have updated prescriptions.        Dose/Directions    * Etanercept 50 MG/ML autoinjector   Commonly known as:  ENBREL SURECLICK   This may have changed:  Another medication with the same name was added. Make sure you understand how and when to take each.   Used for:  Rheumatoid arthritis involving multiple joints (H)   Changed by:  Greg Merida MD        Dose:  50 mg   Inject 50 mg Subcutaneous once a week   Quantity:  3 kit   Refills:  0       * Etanercept 50 MG/ML autoinjector   Commonly known as:  ENBREL SURECLICK   This may have changed:  You were already taking a medication with the same name, and this prescription was added. Make sure you understand how and when to take each.   Used for:  Rheumatoid arthritis involving multiple joints (H)   Changed by:  Greg Merida MD        Dose:  50 mg   Inject 50 mg Subcutaneous See Admin Instructions Every 10 days. Hold for signs of infection, and seek medical attention.   Quantity:  1 kit   Refills:  10       * Notice:  This list has 2 medication(s) that are the same as other medications prescribed for you. Read the directions carefully, and ask your doctor or other care provider to review them with you.         Where to get your medicines      These medications were sent to [facility] -  [address]  [address] [address]     Phone:  [phone]     Etanercept 50 MG/ML autoinjector                Primary Care Provider Office Phone # Fax #    Melina Mckay [phone]       [facility] [address] [place] [address]        Equal Access to Services     [facility]: Hadii aad ku hadasho Soomaali, waaxda luqadaha, qaybta kaalmada adeegyada, waxay idiin hayaan adeeg kharash la'aan . So [facility] [phone].    ATENCIÓN: Si habla español, tiene a thornton disposición servicios gratuitos de asistencia lingüística. Llame al [phone].    We comply with applicable federal civil rights laws and [place] laws. We do not discriminate on the basis of race, color, national origin, age, disability, sex, sexual orientation, or gender identity.            Thank you!     Thank you for choosing [facility]  for your care. Our goal is always to provide you with excellent care. Hearing back from our patients is one way we can continue to improve our services. Please take a few minutes to complete the written survey that you may receive in the mail after your visit with us. Thank you!             Your Updated Medication List - Protect others around you: Learn how to safely use, store and throw away your medicines at www.disposemymeds.org.          This list is accurate as of 2/26/18  2:39 PM.  Always use your most recent med list.                   Brand Name Dispense Instructions for use Diagnosis    calcium-vitamin D 600-400 MG-UNIT per tablet    CALTRATE    120 tablet    Take 1 tablet by mouth 2 times daily    Osteoporosis, unspecified       * Etanercept 50 MG/ML autoinjector    ENBREL SURECLICK    3 kit    Inject 50 mg Subcutaneous once a week    Rheumatoid arthritis involving multiple joints (H)       * Etanercept 50 MG/ML autoinjector    ENBREL SURECLICK    1 kit    Inject 50 mg Subcutaneous See Admin Instructions Every 10 days. Hold for signs of infection,  and seek medical attention.    Rheumatoid arthritis involving multiple joints (H)       FIBER PO           FOSAMAX PO      Take 70 mg by mouth once a week        sertraline 50 MG tablet    ZOLOFT     Take 50 mg by mouth daily        * Notice:  This list has 2 medication(s) that are the same as other medications prescribed for you. Read the directions carefully, and ask your doctor or other care provider to review them with you.

## 2018-02-27 LAB
ALT SERPL W P-5'-P-CCNC: 32 U/L (ref 0–50)
AST SERPL W P-5'-P-CCNC: 30 U/L (ref 0–45)
CREAT SERPL-MCNC: 0.74 MG/DL (ref 0.52–1.04)
GFR SERPL CREATININE-BSD FRML MDRD: 82 ML/MIN/1.7M2
HBV SURFACE AG SERPL QL IA: NONREACTIVE
HCV AB SERPL QL IA: NONREACTIVE

## 2018-02-28 LAB
M TB TUBERC IFN-G BLD QL: NEGATIVE
M TB TUBERC IFN-G/MITOGEN IGNF BLD: 0.02 IU/ML

## 2018-03-02 NOTE — PROGRESS NOTES
Results released to [facility]:  Basic blood cell counts, liver and kidney function labs within normal limits  Inflammatory markers are normal.   Screening for Chronic Hepatitis B, Chronic Hepatitis C and TB are negative. These tests are done every few years if you need to be on any immunomodulatory agents.         Sincerely    Greg Merida MD  [facility]

## 2018-03-07 ENCOUNTER — MYC MEDICAL ADVICE (OUTPATIENT)
Dept: RHEUMATOLOGY | Facility: CLINIC | Age: 53
End: 2018-03-07

## 2018-03-08 NOTE — TELEPHONE ENCOUNTER
I tried doing a PA on cover my meds and they state that it has already been approved.   Liudmila LEVINE.: Since the plan has an approval on file for the patient and medication, they are seeing this request as a duplicate even though the quantity. is different.   Liudmila LEVINE.: I can recommend reaching out to [facility] directly at: [phone] and a representative will be able to help you start the request for the new quantity.   Christina Camacho LPN

## 2018-03-09 NOTE — TELEPHONE ENCOUNTER
I received a PA form from [facility], I filled form out and faxed it to them. Christina Camacho LPN

## 2018-03-12 NOTE — TELEPHONE ENCOUNTER
Received notice today that enbrel is approved, copy of approval sent to [employer].    MAGNUSM. For pt to call us , we need to let her know it has been approved. Christina Camacho LPN

## 2019-03-04 ENCOUNTER — OFFICE VISIT (OUTPATIENT)
Dept: RHEUMATOLOGY | Facility: CLINIC | Age: 54
End: 2019-03-04
Payer: COMMERCIAL

## 2019-03-04 VITALS
OXYGEN SATURATION: 95 % | WEIGHT: 137 LBS | SYSTOLIC BLOOD PRESSURE: 108 MMHG | BODY MASS INDEX: 21.5 KG/M2 | HEART RATE: 76 BPM | HEIGHT: 67 IN | TEMPERATURE: 98.1 F | DIASTOLIC BLOOD PRESSURE: 70 MMHG

## 2019-03-04 DIAGNOSIS — M05.79 RHEUMATOID ARTHRITIS, SEROPOSITIVE, MULTIPLE SITES (H): Primary | ICD-10-CM

## 2019-03-04 DIAGNOSIS — M81.8 OSTEOPOROSIS, IDIOPATHIC: ICD-10-CM

## 2019-03-04 DIAGNOSIS — M06.9 RHEUMATOID ARTHRITIS INVOLVING MULTIPLE JOINTS (H): ICD-10-CM

## 2019-03-04 LAB
BASOPHILS # BLD AUTO: 0.1 10E9/L (ref 0–0.2)
BASOPHILS NFR BLD AUTO: 0.9 %
CRP SERPL-MCNC: <2.9 MG/L (ref 0–8)
DIFFERENTIAL METHOD BLD: NORMAL
EOSINOPHIL # BLD AUTO: 0.3 10E9/L (ref 0–0.7)
EOSINOPHIL NFR BLD AUTO: 3.8 %
ERYTHROCYTE [DISTWIDTH] IN BLOOD BY AUTOMATED COUNT: 12.9 % (ref 10–15)
HCT VFR BLD AUTO: 38.9 % (ref 35–47)
HGB BLD-MCNC: 12.4 G/DL (ref 11.7–15.7)
LYMPHOCYTES # BLD AUTO: 2.4 10E9/L (ref 0.8–5.3)
LYMPHOCYTES NFR BLD AUTO: 36.8 %
MCH RBC QN AUTO: 31 PG (ref 26.5–33)
MCHC RBC AUTO-ENTMCNC: 31.9 G/DL (ref 31.5–36.5)
MCV RBC AUTO: 97 FL (ref 78–100)
MONOCYTES # BLD AUTO: 0.6 10E9/L (ref 0–1.3)
MONOCYTES NFR BLD AUTO: 8.5 %
NEUTROPHILS # BLD AUTO: 3.3 10E9/L (ref 1.6–8.3)
NEUTROPHILS NFR BLD AUTO: 50 %
PLATELET # BLD AUTO: 185 10E9/L (ref 150–450)
RBC # BLD AUTO: 4 10E12/L (ref 3.8–5.2)
WBC # BLD AUTO: 6.6 10E9/L (ref 4–11)

## 2019-03-04 PROCEDURE — 84460 ALANINE AMINO (ALT) (SGPT): CPT | Performed by: INTERNAL MEDICINE

## 2019-03-04 PROCEDURE — 84450 TRANSFERASE (AST) (SGOT): CPT | Performed by: INTERNAL MEDICINE

## 2019-03-04 PROCEDURE — 85025 COMPLETE CBC W/AUTO DIFF WBC: CPT | Performed by: INTERNAL MEDICINE

## 2019-03-04 PROCEDURE — 36415 COLL VENOUS BLD VENIPUNCTURE: CPT | Performed by: INTERNAL MEDICINE

## 2019-03-04 PROCEDURE — 99214 OFFICE O/P EST MOD 30 MIN: CPT | Performed by: INTERNAL MEDICINE

## 2019-03-04 PROCEDURE — 82565 ASSAY OF CREATININE: CPT | Performed by: INTERNAL MEDICINE

## 2019-03-04 PROCEDURE — 86140 C-REACTIVE PROTEIN: CPT | Performed by: INTERNAL MEDICINE

## 2019-03-04 ASSESSMENT — MIFFLIN-ST. JEOR: SCORE: 1259.06

## 2019-03-04 ASSESSMENT — ROUTINE ASSESSMENT OF PATIENT INDEX DATA (RAPID3)
TOTAL RAPID3 SCORE: 1
RAPID3 INTERPRETATION: REMISSION

## 2019-03-04 NOTE — NURSING NOTE
"Chief Complaint   Patient presents with     RECHECK       Initial /70   Pulse 76   Temp 98.1  F (36.7  C) (Oral)   Ht 1.702 m (5' 7\")   Wt 62.1 kg (137 lb)   SpO2 95%   BMI 21.46 kg/m   Estimated body mass index is 21.46 kg/m  as calculated from the following:    Height as of this encounter: 1.702 m (5' 7\").    Weight as of this encounter: 62.1 kg (137 lb).  Medication Reconciliation: complete    Have you ever seen a rheumatologist yes Who you When 2/26/18  Joint pain history  Onset: Patient is here for a follow up for RA. Patient reports things are going good.  Involved joints: none  Pain scale:  0.5/10     Wakes the patient from sleep : No  Morning stiffness:Yes for 5 minutes  Meds used:enbrel    Interim history  Since last visit:  1. Infections - No  2. New symptoms/medical problem - No  3. Any side effects from Rheum medications -none  3. ER visits/Hospitalizations/surgeries - No  4. Last PCP visit: 2/2018 Dr. Caceres  Wt Readings from Last 4 Encounters:   03/04/19 62.1 kg (137 lb)   02/26/18 63 kg (138 lb 12.8 oz)   02/23/17 60.8 kg (134 lb)   02/04/16 71 kg (156 lb 9.6 oz)     BP Readings from Last 3 Encounters:   03/04/19 108/70   02/26/18 104/62   02/23/17 105/70       "

## 2019-03-05 LAB
ALT SERPL W P-5'-P-CCNC: 37 U/L (ref 0–50)
AST SERPL W P-5'-P-CCNC: 31 U/L (ref 0–45)
CREAT SERPL-MCNC: 0.78 MG/DL (ref 0.52–1.04)
GFR SERPL CREATININE-BSD FRML MDRD: 86 ML/MIN/{1.73_M2}

## 2019-03-07 NOTE — RESULT ENCOUNTER NOTE
Results released to [facility]:  Dear Ms. He,  Basic blood cell counts, liver and kidney function labs within normal limits  Inflammatory markers are normal. Good!    If you have not done so already, please consider rating me online:  Healthgrades: [email or web address]  Vitals:  [email or web address]  [email or web address]  [email or web address]      Sincerely    Greg Merida MD  [facility]

## 2019-03-15 ENCOUNTER — TELEPHONE (OUTPATIENT)
Dept: RHEUMATOLOGY | Facility: CLINIC | Age: 54
End: 2019-03-15

## 2019-03-15 NOTE — TELEPHONE ENCOUNTER
Prior Authorization Specialty Medication Request    Medication/Dose: Enbrel SureClick 50MG/ML auto injector  ICD code (if different than what is on RX):    Previously Tried and Failed:  Enbrel PA renewal    COVER MY MEDS INFO:  KEY: [id number]  Last name: NIKHIL  : 1965    Important Lab Values:   Rationale:     Insurance Name: [employer]  Insurance ID: [id number]   Insurance Phone Number:     Pharmacy Information (if different than what is on RX)  Name:  [facility]  Phone:  [phone]

## 2019-03-18 ENCOUNTER — MYC MEDICAL ADVICE (OUTPATIENT)
Dept: RHEUMATOLOGY | Facility: CLINIC | Age: 54
End: 2019-03-18

## 2019-03-20 ENCOUNTER — TELEPHONE (OUTPATIENT)
Dept: RHEUMATOLOGY | Facility: CLINIC | Age: 54
End: 2019-03-20

## 2019-03-20 NOTE — TELEPHONE ENCOUNTER
PA Initiation    Medication: Adair FLANNERY renewal - INITIATED  Insurance Company: [employer] - Phone [phone] Fax [phone]  Pharmacy Filling the Rx: [facility] - [place], [place] - [address] AT PORTAL TO REGISTERED [facility] SITES  Filling Pharmacy Phone: [phone]  Filling Pharmacy Fax:    Start Date: 3/20/2019

## 2019-03-20 NOTE — TELEPHONE ENCOUNTER
Prior Authorization Retail Medication Request    Medication/Dose: etanercept (ENBREL SURECLICK) 50 MG/ML autoinjector  ICD code (if different than what is on RX):  Rheumatoid arthritis involving multiple joints (H) [M06.9]  Previously Tried and Failed:    Rationale:      Insurance Name:  [employer]  Insurance ID:  [id number]       Pharmacy Information (if different than what is on RX)  Name:  [facility] [place] [facility]  Phone: [phone]

## 2019-03-21 NOTE — TELEPHONE ENCOUNTER
Prior Authorization Approval    Authorization Effective Date: 2/20/2019  Authorization Expiration Date: 3/20/2020  Medication: Ebrel PA renewal - APPROVED  Approved Dose/Quantity: 4 SYRINGES PER 30 DAYS   Reference #: [id number]   Insurance Company: [employer] - Phone [phone] Fax [phone]  Expected CoPay:       CoPay Card Available:      Foundation Assistance Needed:    Which Pharmacy is filling the prescription (Not needed for infusion/clinic administered):  PHARMACY - [place] - [facility] [address] AT [address]AL TO [facility]  Pharmacy Notified: no - tried calling several times - waited on hold and then was hung up on  Patient Notified: Yes, called PT and left her a message to contact her pharmacy

## 2019-03-25 NOTE — TELEPHONE ENCOUNTER
Please see telephone encounter from 3/15/19. This medication has been approved already with dates of 02/20/19-03/20/20.

## 2019-04-01 ENCOUNTER — MYC MEDICAL ADVICE (OUTPATIENT)
Dept: RHEUMATOLOGY | Facility: CLINIC | Age: 54
End: 2019-04-01

## 2019-04-01 DIAGNOSIS — M06.9 RHEUMATOID ARTHRITIS INVOLVING MULTIPLE JOINTS (H): ICD-10-CM

## 2019-04-09 ENCOUNTER — TELEPHONE (OUTPATIENT)
Dept: RHEUMATOLOGY | Facility: CLINIC | Age: 54
End: 2019-04-09

## 2019-04-09 DIAGNOSIS — M06.9 RHEUMATOID ARTHRITIS INVOLVING MULTIPLE JOINTS (H): ICD-10-CM

## 2019-04-09 NOTE — TELEPHONE ENCOUNTER
[facility] was calling and the pt would like the rx sent over to them.   Yessi Hendrickson on 4/9/2019 at 9:17 AM

## 2019-04-09 NOTE — TELEPHONE ENCOUNTER
Requested Prescriptions   Pending Prescriptions Disp Refills     etanercept (ENBREL SURECLICK) 50 MG/ML autoinjector  Last Written Prescription Date:  04/01/2019  Last Fill Quantity: 1 kit,  # refills: 6    Last office visit: 3/4/2019 with prescribing provider:  Greg Merida MD        Future Office Visit:     1 kit 6     Sig: Inject 50 mg Subcutaneous See Admin Instructions Every 7 days. Hold for signs of infection, and seek medical attention.       There is no refill protocol information for this order

## 2019-04-11 NOTE — TELEPHONE ENCOUNTER
Routing refill request to provider for review/approval because:  Patient having filled at a local [facility] pharmacy and they are requesting script sent to them  Christi BYRNES RN - Triage  [facility]

## 2019-04-12 NOTE — TELEPHONE ENCOUNTER
I called and spoke to the patient. Patient states that she is getting her Enbrel through [facility] and her insurance covered it. She would like to continue getting filled through [facility].    Chaya Smith, [facility]

## 2019-05-08 ENCOUNTER — MYC MEDICAL ADVICE (OUTPATIENT)
Dept: RHEUMATOLOGY | Facility: CLINIC | Age: 54
End: 2019-05-08

## 2019-08-19 ENCOUNTER — TRANSFERRED RECORDS (OUTPATIENT)
Dept: HEALTH INFORMATION MANAGEMENT | Facility: CLINIC | Age: 54
End: 2019-08-19

## 2020-03-02 ENCOUNTER — HEALTH MAINTENANCE LETTER (OUTPATIENT)
Age: 55
End: 2020-03-02

## 2020-04-22 DIAGNOSIS — M06.9 RHEUMATOID ARTHRITIS INVOLVING MULTIPLE JOINTS (H): ICD-10-CM

## 2020-04-27 RX ORDER — MEDROXYPROGESTERONE ACETATE 150 MG/ML
50 INJECTION, SUSPENSION INTRAMUSCULAR SEE ADMIN INSTRUCTIONS
Qty: 1 KIT | Refills: 6 | Status: CANCELLED | OUTPATIENT
Start: 2020-04-27

## 2020-04-27 NOTE — TELEPHONE ENCOUNTER
Patient needs a new rheumatologist please assist patient in scheduling.     Shirin Harper RN on 4/27/2020 at 11:09 AM

## 2020-04-27 NOTE — TELEPHONE ENCOUNTER
Routing refill request to provider for review/approval because:  Drug not on the [facility] refill protocol     Shirin Harper RN on 4/27/2020 at 11:04 AM

## 2020-12-20 ENCOUNTER — HEALTH MAINTENANCE LETTER (OUTPATIENT)
Age: 55
End: 2020-12-20

## 2021-04-24 ENCOUNTER — HEALTH MAINTENANCE LETTER (OUTPATIENT)
Age: 56
End: 2021-04-24

## 2021-10-03 ENCOUNTER — HEALTH MAINTENANCE LETTER (OUTPATIENT)
Age: 56
End: 2021-10-03

## 2022-03-20 ENCOUNTER — HEALTH MAINTENANCE LETTER (OUTPATIENT)
Age: 57
End: 2022-03-20

## 2022-05-15 ENCOUNTER — HEALTH MAINTENANCE LETTER (OUTPATIENT)
Age: 57
End: 2022-05-15

## 2022-09-10 ENCOUNTER — HEALTH MAINTENANCE LETTER (OUTPATIENT)
Age: 57
End: 2022-09-10

## 2023-06-03 ENCOUNTER — HEALTH MAINTENANCE LETTER (OUTPATIENT)
Age: 58
End: 2023-06-03